# Patient Record
Sex: FEMALE | Race: WHITE | Employment: FULL TIME | URBAN - METROPOLITAN AREA
[De-identification: names, ages, dates, MRNs, and addresses within clinical notes are randomized per-mention and may not be internally consistent; named-entity substitution may affect disease eponyms.]

---

## 2023-04-14 ENCOUNTER — HOSPITAL ENCOUNTER (OUTPATIENT)
Dept: PREADMISSION TESTING | Age: 38
Discharge: HOME | End: 2023-04-14
Payer: SELF-PAY

## 2023-04-14 VITALS
HEIGHT: 68 IN | HEART RATE: 65 BPM | WEIGHT: 218.26 LBS | SYSTOLIC BLOOD PRESSURE: 117 MMHG | BODY MASS INDEX: 33.08 KG/M2 | DIASTOLIC BLOOD PRESSURE: 81 MMHG | TEMPERATURE: 97.5 F

## 2023-04-14 LAB
ANION GAP SERPL CALC-SCNC: 5 MMOL/L (ref 5–15)
BASOPHILS # BLD: 0 K/UL (ref 0–0.1)
BASOPHILS NFR BLD: 0 % (ref 0–1)
BUN SERPL-MCNC: 15 MG/DL (ref 6–20)
BUN/CREAT SERPL: 22 (ref 12–20)
CALCIUM SERPL-MCNC: 8.9 MG/DL (ref 8.5–10.1)
CHLORIDE SERPL-SCNC: 105 MMOL/L (ref 97–108)
CO2 SERPL-SCNC: 27 MMOL/L (ref 21–32)
CREAT SERPL-MCNC: 0.69 MG/DL (ref 0.55–1.02)
DIFFERENTIAL METHOD BLD: NORMAL
EOSINOPHIL # BLD: 0.1 K/UL (ref 0–0.4)
EOSINOPHIL NFR BLD: 1 % (ref 0–7)
ERYTHROCYTE [DISTWIDTH] IN BLOOD BY AUTOMATED COUNT: 13.3 % (ref 11.5–14.5)
GLUCOSE SERPL-MCNC: 100 MG/DL (ref 65–100)
HCT VFR BLD AUTO: 39.2 % (ref 35–47)
HGB BLD-MCNC: 12.8 G/DL (ref 11.5–16)
IMM GRANULOCYTES # BLD AUTO: 0 K/UL
IMM GRANULOCYTES NFR BLD AUTO: 0 %
LYMPHOCYTES # BLD: 2.6 K/UL (ref 0.8–3.5)
LYMPHOCYTES NFR BLD: 30 % (ref 12–49)
MCH RBC QN AUTO: 29.8 PG (ref 26–34)
MCHC RBC AUTO-ENTMCNC: 32.7 G/DL (ref 30–36.5)
MCV RBC AUTO: 91.2 FL (ref 80–99)
MONOCYTES # BLD: 0.6 K/UL (ref 0–1)
MONOCYTES NFR BLD: 7 % (ref 5–13)
NEUTS SEG # BLD: 5.4 K/UL (ref 1.8–8)
NEUTS SEG NFR BLD: 62 % (ref 32–75)
NRBC # BLD: 0 K/UL (ref 0–0.01)
NRBC BLD-RTO: 0 PER 100 WBC
PLATELET # BLD AUTO: 327 K/UL (ref 150–400)
PMV BLD AUTO: 10.4 FL (ref 8.9–12.9)
POTASSIUM SERPL-SCNC: 4.2 MMOL/L (ref 3.5–5.1)
RBC # BLD AUTO: 4.3 M/UL (ref 3.8–5.2)
RBC MORPH BLD: NORMAL
SODIUM SERPL-SCNC: 137 MMOL/L (ref 136–145)
WBC # BLD AUTO: 8.7 K/UL (ref 3.6–11)

## 2023-04-14 PROCEDURE — 36415 COLL VENOUS BLD VENIPUNCTURE: CPT

## 2023-04-14 PROCEDURE — 93005 ELECTROCARDIOGRAM TRACING: CPT

## 2023-04-14 PROCEDURE — 80048 BASIC METABOLIC PNL TOTAL CA: CPT

## 2023-04-14 PROCEDURE — 85025 COMPLETE CBC W/AUTO DIFF WBC: CPT

## 2023-04-14 RX ORDER — SERTRALINE HYDROCHLORIDE 100 MG/1
100 TABLET, FILM COATED ORAL
COMMUNITY

## 2023-04-14 RX ORDER — MELATONIN
1000 DAILY
COMMUNITY

## 2023-04-15 LAB
ATRIAL RATE: 68 BPM
CALCULATED P AXIS, ECG09: 22 DEGREES
CALCULATED R AXIS, ECG10: 14 DEGREES
CALCULATED T AXIS, ECG11: 18 DEGREES
DIAGNOSIS, 93000: NORMAL
P-R INTERVAL, ECG05: 178 MS
Q-T INTERVAL, ECG07: 428 MS
QRS DURATION, ECG06: 82 MS
QTC CALCULATION (BEZET), ECG08: 455 MS
VENTRICULAR RATE, ECG03: 68 BPM

## 2023-04-25 ENCOUNTER — ANESTHESIA EVENT (OUTPATIENT)
Dept: MEDSURG UNIT | Age: 38
End: 2023-04-25
Payer: SELF-PAY

## 2023-04-26 ENCOUNTER — ANESTHESIA (OUTPATIENT)
Dept: MEDSURG UNIT | Age: 38
End: 2023-04-26
Payer: SELF-PAY

## 2023-04-26 ENCOUNTER — HOSPITAL ENCOUNTER (OUTPATIENT)
Age: 38
Setting detail: OBSERVATION
Discharge: HOME | End: 2023-04-27
Attending: PLASTIC SURGERY | Admitting: PLASTIC SURGERY
Payer: SELF-PAY

## 2023-04-26 PROBLEM — Z98.890 S/P COSMETIC PLASTIC SURGERY: Status: ACTIVE | Noted: 2023-04-26

## 2023-04-26 LAB — HCG UR QL: NEGATIVE

## 2023-04-26 PROCEDURE — 74011000250 HC RX REV CODE- 250: Performed by: ANESTHESIOLOGY

## 2023-04-26 PROCEDURE — G0378 HOSPITAL OBSERVATION PER HR: HCPCS

## 2023-04-26 PROCEDURE — 77030012406 HC DRN WND PENRS BARD -A: Performed by: PLASTIC SURGERY

## 2023-04-26 PROCEDURE — 74011250636 HC RX REV CODE- 250/636: Performed by: ANESTHESIOLOGY

## 2023-04-26 PROCEDURE — 77030040206 HC TBNG LIPO SUC MDII -A: Performed by: PLASTIC SURGERY

## 2023-04-26 PROCEDURE — 76030000010 HC AMB SURG OR TIME 5 TO 5.5: Performed by: PLASTIC SURGERY

## 2023-04-26 PROCEDURE — 74011250636 HC RX REV CODE- 250/636: Performed by: PLASTIC SURGERY

## 2023-04-26 PROCEDURE — 74011250636 HC RX REV CODE- 250/636: Performed by: STUDENT IN AN ORGANIZED HEALTH CARE EDUCATION/TRAINING PROGRAM

## 2023-04-26 PROCEDURE — 77030040361 HC SLV COMPR DVT MDII -B: Performed by: PLASTIC SURGERY

## 2023-04-26 PROCEDURE — 77030008462 HC STPLR SKN PROX J&J -A: Performed by: PLASTIC SURGERY

## 2023-04-26 PROCEDURE — 77030008534 HC TBNG LIPOSUC BYRO -B: Performed by: PLASTIC SURGERY

## 2023-04-26 PROCEDURE — 74011250637 HC RX REV CODE- 250/637: Performed by: PLASTIC SURGERY

## 2023-04-26 PROCEDURE — 74011000250 HC RX REV CODE- 250: Performed by: PLASTIC SURGERY

## 2023-04-26 PROCEDURE — 2709999900 HC NON-CHARGEABLE SUPPLY: Performed by: PLASTIC SURGERY

## 2023-04-26 PROCEDURE — 77030002916 HC SUT ETHLN J&J -A: Performed by: PLASTIC SURGERY

## 2023-04-26 PROCEDURE — 76210000034 HC AMBSU PH I REC 0.5 TO 1 HR: Performed by: PLASTIC SURGERY

## 2023-04-26 PROCEDURE — 77030008684 HC TU ET CUF COVD -B: Performed by: ANESTHESIOLOGY

## 2023-04-26 PROCEDURE — 76060000070 HC AMB SURG ANES 5 TO 5.5 HR: Performed by: PLASTIC SURGERY

## 2023-04-26 PROCEDURE — 77030026438 HC STYL ET INTUB CARD -A: Performed by: ANESTHESIOLOGY

## 2023-04-26 PROCEDURE — 81025 URINE PREGNANCY TEST: CPT

## 2023-04-26 RX ORDER — HYDROMORPHONE HYDROCHLORIDE 2 MG/ML
INJECTION, SOLUTION INTRAMUSCULAR; INTRAVENOUS; SUBCUTANEOUS AS NEEDED
Status: DISCONTINUED | OUTPATIENT
Start: 2023-04-26 | End: 2023-04-26 | Stop reason: HOSPADM

## 2023-04-26 RX ORDER — MORPHINE SULFATE 2 MG/ML
2 INJECTION, SOLUTION INTRAMUSCULAR; INTRAVENOUS
Status: DISCONTINUED | OUTPATIENT
Start: 2023-04-26 | End: 2023-04-26 | Stop reason: HOSPADM

## 2023-04-26 RX ORDER — DEXMEDETOMIDINE HYDROCHLORIDE 100 UG/ML
INJECTION, SOLUTION INTRAVENOUS AS NEEDED
Status: DISCONTINUED | OUTPATIENT
Start: 2023-04-26 | End: 2023-04-26 | Stop reason: HOSPADM

## 2023-04-26 RX ORDER — PROCHLORPERAZINE EDISYLATE 5 MG/ML
10 INJECTION INTRAMUSCULAR; INTRAVENOUS
Status: DISCONTINUED | OUTPATIENT
Start: 2023-04-26 | End: 2023-04-27 | Stop reason: HOSPADM

## 2023-04-26 RX ORDER — SODIUM CHLORIDE, SODIUM LACTATE, POTASSIUM CHLORIDE, CALCIUM CHLORIDE 600; 310; 30; 20 MG/100ML; MG/100ML; MG/100ML; MG/100ML
INJECTION, SOLUTION INTRAVENOUS
Status: DISCONTINUED | OUTPATIENT
Start: 2023-04-26 | End: 2023-04-26 | Stop reason: HOSPADM

## 2023-04-26 RX ORDER — FENTANYL CITRATE 50 UG/ML
25 INJECTION, SOLUTION INTRAMUSCULAR; INTRAVENOUS
Status: DISCONTINUED | OUTPATIENT
Start: 2023-04-26 | End: 2023-04-26 | Stop reason: HOSPADM

## 2023-04-26 RX ORDER — ROPIVACAINE HYDROCHLORIDE 5 MG/ML
30 INJECTION, SOLUTION EPIDURAL; INFILTRATION; PERINEURAL AS NEEDED
Status: DISCONTINUED | OUTPATIENT
Start: 2023-04-26 | End: 2023-04-26 | Stop reason: HOSPADM

## 2023-04-26 RX ORDER — ONDANSETRON 4 MG/1
8 TABLET, ORALLY DISINTEGRATING ORAL
Status: DISCONTINUED | OUTPATIENT
Start: 2023-04-26 | End: 2023-04-27 | Stop reason: HOSPADM

## 2023-04-26 RX ORDER — SODIUM CHLORIDE, SODIUM LACTATE, POTASSIUM CHLORIDE, CALCIUM CHLORIDE 600; 310; 30; 20 MG/100ML; MG/100ML; MG/100ML; MG/100ML
125 INJECTION, SOLUTION INTRAVENOUS CONTINUOUS
Status: DISCONTINUED | OUTPATIENT
Start: 2023-04-26 | End: 2023-04-26 | Stop reason: HOSPADM

## 2023-04-26 RX ORDER — MIDAZOLAM HYDROCHLORIDE 1 MG/ML
1 INJECTION, SOLUTION INTRAMUSCULAR; INTRAVENOUS
Status: DISCONTINUED | OUTPATIENT
Start: 2023-04-26 | End: 2023-04-26 | Stop reason: HOSPADM

## 2023-04-26 RX ORDER — NEOSTIGMINE METHYLSULFATE 1 MG/ML
INJECTION, SOLUTION INTRAVENOUS AS NEEDED
Status: DISCONTINUED | OUTPATIENT
Start: 2023-04-26 | End: 2023-04-26 | Stop reason: HOSPADM

## 2023-04-26 RX ORDER — KETAMINE HCL IN 0.9 % NACL 50 MG/5 ML
SYRINGE (ML) INTRAVENOUS AS NEEDED
Status: DISCONTINUED | OUTPATIENT
Start: 2023-04-26 | End: 2023-04-26 | Stop reason: HOSPADM

## 2023-04-26 RX ORDER — DIAZEPAM 2 MG/1
5 TABLET ORAL
Status: DISCONTINUED | OUTPATIENT
Start: 2023-04-26 | End: 2023-04-27 | Stop reason: HOSPADM

## 2023-04-26 RX ORDER — SODIUM CHLORIDE 0.9 % (FLUSH) 0.9 %
5-40 SYRINGE (ML) INJECTION AS NEEDED
Status: DISCONTINUED | OUTPATIENT
Start: 2023-04-26 | End: 2023-04-26 | Stop reason: HOSPADM

## 2023-04-26 RX ORDER — ROCURONIUM BROMIDE 10 MG/ML
INJECTION, SOLUTION INTRAVENOUS AS NEEDED
Status: DISCONTINUED | OUTPATIENT
Start: 2023-04-26 | End: 2023-04-26 | Stop reason: HOSPADM

## 2023-04-26 RX ORDER — ONDANSETRON 2 MG/ML
INJECTION INTRAMUSCULAR; INTRAVENOUS AS NEEDED
Status: DISCONTINUED | OUTPATIENT
Start: 2023-04-26 | End: 2023-04-26 | Stop reason: HOSPADM

## 2023-04-26 RX ORDER — ONDANSETRON 2 MG/ML
4 INJECTION INTRAMUSCULAR; INTRAVENOUS AS NEEDED
Status: DISCONTINUED | OUTPATIENT
Start: 2023-04-26 | End: 2023-04-26 | Stop reason: HOSPADM

## 2023-04-26 RX ORDER — FENTANYL CITRATE 50 UG/ML
50 INJECTION, SOLUTION INTRAMUSCULAR; INTRAVENOUS AS NEEDED
Status: DISCONTINUED | OUTPATIENT
Start: 2023-04-26 | End: 2023-04-26 | Stop reason: HOSPADM

## 2023-04-26 RX ORDER — BACITRACIN ZINC 500 UNIT/G
5 OINTMENT (GRAM) TOPICAL 2 TIMES DAILY
Status: DISCONTINUED | OUTPATIENT
Start: 2023-04-26 | End: 2023-04-26

## 2023-04-26 RX ORDER — FENTANYL CITRATE 50 UG/ML
INJECTION, SOLUTION INTRAMUSCULAR; INTRAVENOUS AS NEEDED
Status: DISCONTINUED | OUTPATIENT
Start: 2023-04-26 | End: 2023-04-26 | Stop reason: HOSPADM

## 2023-04-26 RX ORDER — DEXTROSE, SODIUM CHLORIDE, SODIUM LACTATE, POTASSIUM CHLORIDE, AND CALCIUM CHLORIDE 5; .6; .31; .03; .02 G/100ML; G/100ML; G/100ML; G/100ML; G/100ML
125 INJECTION, SOLUTION INTRAVENOUS CONTINUOUS
Status: DISCONTINUED | OUTPATIENT
Start: 2023-04-26 | End: 2023-04-26 | Stop reason: HOSPADM

## 2023-04-26 RX ORDER — LIDOCAINE HYDROCHLORIDE 10 MG/ML
0.5 INJECTION, SOLUTION EPIDURAL; INFILTRATION; INTRACAUDAL; PERINEURAL AS NEEDED
Status: DISCONTINUED | OUTPATIENT
Start: 2023-04-26 | End: 2023-04-26 | Stop reason: HOSPADM

## 2023-04-26 RX ORDER — MIDAZOLAM HYDROCHLORIDE 1 MG/ML
1 INJECTION, SOLUTION INTRAMUSCULAR; INTRAVENOUS AS NEEDED
Status: DISCONTINUED | OUTPATIENT
Start: 2023-04-26 | End: 2023-04-26 | Stop reason: HOSPADM

## 2023-04-26 RX ORDER — PROPOFOL 10 MG/ML
INJECTION, EMULSION INTRAVENOUS AS NEEDED
Status: DISCONTINUED | OUTPATIENT
Start: 2023-04-26 | End: 2023-04-26 | Stop reason: HOSPADM

## 2023-04-26 RX ORDER — OXYCODONE HYDROCHLORIDE 5 MG/1
5 TABLET ORAL AS NEEDED
Status: DISCONTINUED | OUTPATIENT
Start: 2023-04-26 | End: 2023-04-26 | Stop reason: HOSPADM

## 2023-04-26 RX ORDER — HYDROMORPHONE HYDROCHLORIDE 2 MG/ML
2 INJECTION, SOLUTION INTRAMUSCULAR; INTRAVENOUS; SUBCUTANEOUS
Status: DISCONTINUED | OUTPATIENT
Start: 2023-04-26 | End: 2023-04-27 | Stop reason: HOSPADM

## 2023-04-26 RX ORDER — SCOLOPAMINE TRANSDERMAL SYSTEM 1 MG/1
1 PATCH, EXTENDED RELEASE TRANSDERMAL
Status: DISCONTINUED | OUTPATIENT
Start: 2023-04-26 | End: 2023-04-26 | Stop reason: HOSPADM

## 2023-04-26 RX ORDER — SODIUM CHLORIDE 0.9 % (FLUSH) 0.9 %
5-40 SYRINGE (ML) INJECTION EVERY 8 HOURS
Status: DISCONTINUED | OUTPATIENT
Start: 2023-04-26 | End: 2023-04-26 | Stop reason: HOSPADM

## 2023-04-26 RX ORDER — LIDOCAINE HYDROCHLORIDE 20 MG/ML
INJECTION, SOLUTION EPIDURAL; INFILTRATION; INTRACAUDAL; PERINEURAL AS NEEDED
Status: DISCONTINUED | OUTPATIENT
Start: 2023-04-26 | End: 2023-04-26 | Stop reason: HOSPADM

## 2023-04-26 RX ORDER — EPHEDRINE SULFATE/0.9% NACL/PF 50 MG/5 ML
SYRINGE (ML) INTRAVENOUS AS NEEDED
Status: DISCONTINUED | OUTPATIENT
Start: 2023-04-26 | End: 2023-04-26 | Stop reason: HOSPADM

## 2023-04-26 RX ORDER — MIDAZOLAM HYDROCHLORIDE 1 MG/ML
INJECTION, SOLUTION INTRAMUSCULAR; INTRAVENOUS AS NEEDED
Status: DISCONTINUED | OUTPATIENT
Start: 2023-04-26 | End: 2023-04-26 | Stop reason: HOSPADM

## 2023-04-26 RX ORDER — ACETAMINOPHEN 325 MG/1
650 TABLET ORAL ONCE
Status: DISCONTINUED | OUTPATIENT
Start: 2023-04-26 | End: 2023-04-26 | Stop reason: HOSPADM

## 2023-04-26 RX ORDER — DIPHENHYDRAMINE HCL 25 MG
50 CAPSULE ORAL
Status: DISCONTINUED | OUTPATIENT
Start: 2023-04-26 | End: 2023-04-27 | Stop reason: HOSPADM

## 2023-04-26 RX ORDER — SODIUM CHLORIDE, SODIUM LACTATE, POTASSIUM CHLORIDE, CALCIUM CHLORIDE 600; 310; 30; 20 MG/100ML; MG/100ML; MG/100ML; MG/100ML
150 INJECTION, SOLUTION INTRAVENOUS CONTINUOUS
Status: DISCONTINUED | OUTPATIENT
Start: 2023-04-26 | End: 2023-04-27 | Stop reason: HOSPADM

## 2023-04-26 RX ORDER — SUCCINYLCHOLINE CHLORIDE 20 MG/ML
INJECTION INTRAMUSCULAR; INTRAVENOUS AS NEEDED
Status: DISCONTINUED | OUTPATIENT
Start: 2023-04-26 | End: 2023-04-26 | Stop reason: HOSPADM

## 2023-04-26 RX ORDER — DEXAMETHASONE SODIUM PHOSPHATE 4 MG/ML
INJECTION, SOLUTION INTRA-ARTICULAR; INTRALESIONAL; INTRAMUSCULAR; INTRAVENOUS; SOFT TISSUE AS NEEDED
Status: DISCONTINUED | OUTPATIENT
Start: 2023-04-26 | End: 2023-04-26 | Stop reason: HOSPADM

## 2023-04-26 RX ORDER — HYDROMORPHONE HYDROCHLORIDE 1 MG/ML
0.2 INJECTION, SOLUTION INTRAMUSCULAR; INTRAVENOUS; SUBCUTANEOUS ONCE
Status: COMPLETED | OUTPATIENT
Start: 2023-04-26 | End: 2023-04-26

## 2023-04-26 RX ORDER — HYDROMORPHONE HYDROCHLORIDE 4 MG/1
4 TABLET ORAL
Status: DISCONTINUED | OUTPATIENT
Start: 2023-04-26 | End: 2023-04-27 | Stop reason: HOSPADM

## 2023-04-26 RX ORDER — GLYCOPYRROLATE 0.2 MG/ML
INJECTION INTRAMUSCULAR; INTRAVENOUS AS NEEDED
Status: DISCONTINUED | OUTPATIENT
Start: 2023-04-26 | End: 2023-04-26 | Stop reason: HOSPADM

## 2023-04-26 RX ORDER — DIPHENHYDRAMINE HYDROCHLORIDE 50 MG/ML
12.5 INJECTION, SOLUTION INTRAMUSCULAR; INTRAVENOUS AS NEEDED
Status: DISCONTINUED | OUTPATIENT
Start: 2023-04-26 | End: 2023-04-26 | Stop reason: HOSPADM

## 2023-04-26 RX ORDER — AMOXICILLIN 250 MG
1 CAPSULE ORAL 2 TIMES DAILY
Status: DISCONTINUED | OUTPATIENT
Start: 2023-04-26 | End: 2023-04-27 | Stop reason: HOSPADM

## 2023-04-26 RX ORDER — BACITRACIN 500 [USP'U]/G
5 OINTMENT TOPICAL 2 TIMES DAILY
Status: DISCONTINUED | OUTPATIENT
Start: 2023-04-26 | End: 2023-04-27 | Stop reason: HOSPADM

## 2023-04-26 RX ADMIN — Medication 25 MG: at 13:35

## 2023-04-26 RX ADMIN — HYDROMORPHONE HYDROCHLORIDE 2 MG: 2 INJECTION, SOLUTION INTRAMUSCULAR; INTRAVENOUS; SUBCUTANEOUS at 20:19

## 2023-04-26 RX ADMIN — ROCURONIUM BROMIDE 20 MG: 10 SOLUTION INTRAVENOUS at 15:40

## 2023-04-26 RX ADMIN — DEXAMETHASONE SODIUM PHOSPHATE 8 MG: 4 INJECTION, SOLUTION INTRAMUSCULAR; INTRAVENOUS at 13:52

## 2023-04-26 RX ADMIN — HYDROMORPHONE HYDROCHLORIDE 0.2 MG: 1 INJECTION, SOLUTION INTRAMUSCULAR; INTRAVENOUS; SUBCUTANEOUS at 19:18

## 2023-04-26 RX ADMIN — ONDANSETRON HYDROCHLORIDE 4 MG: 2 INJECTION, SOLUTION INTRAMUSCULAR; INTRAVENOUS at 17:22

## 2023-04-26 RX ADMIN — Medication 1.5 MG: at 18:24

## 2023-04-26 RX ADMIN — ROCURONIUM BROMIDE 20 MG: 10 SOLUTION INTRAVENOUS at 14:30

## 2023-04-26 RX ADMIN — DEXMEDETOMIDINE HYDROCHLORIDE 10 MCG: 100 INJECTION, SOLUTION, CONCENTRATE INTRAVENOUS at 14:14

## 2023-04-26 RX ADMIN — MIDAZOLAM 2 MG: 1 INJECTION INTRAMUSCULAR; INTRAVENOUS at 13:18

## 2023-04-26 RX ADMIN — HYDROMORPHONE HYDROCHLORIDE 0.5 MG: 2 INJECTION, SOLUTION INTRAMUSCULAR; INTRAVENOUS; SUBCUTANEOUS at 18:22

## 2023-04-26 RX ADMIN — ROCURONIUM BROMIDE 20 MG: 10 SOLUTION INTRAVENOUS at 15:10

## 2023-04-26 RX ADMIN — HYDROMORPHONE HYDROCHLORIDE 2 MG: 2 INJECTION, SOLUTION INTRAMUSCULAR; INTRAVENOUS; SUBCUTANEOUS at 23:05

## 2023-04-26 RX ADMIN — GLYCOPYRROLATE 0.2 MG: 0.2 INJECTION INTRAMUSCULAR; INTRAVENOUS at 18:24

## 2023-04-26 RX ADMIN — HYDROMORPHONE HYDROCHLORIDE 1 MG: 2 INJECTION, SOLUTION INTRAMUSCULAR; INTRAVENOUS; SUBCUTANEOUS at 14:42

## 2023-04-26 RX ADMIN — SODIUM CHLORIDE, POTASSIUM CHLORIDE, SODIUM LACTATE AND CALCIUM CHLORIDE: 600; 310; 30; 20 INJECTION, SOLUTION INTRAVENOUS at 13:18

## 2023-04-26 RX ADMIN — ROCURONIUM BROMIDE 40 MG: 10 SOLUTION INTRAVENOUS at 13:32

## 2023-04-26 RX ADMIN — SUCCINYLCHOLINE CHLORIDE 140 MG: 20 INJECTION, SOLUTION INTRAMUSCULAR; INTRAVENOUS at 13:27

## 2023-04-26 RX ADMIN — DEXMEDETOMIDINE HYDROCHLORIDE 10 MCG: 100 INJECTION, SOLUTION, CONCENTRATE INTRAVENOUS at 13:53

## 2023-04-26 RX ADMIN — LIDOCAINE HYDROCHLORIDE 80 MG: 20 INJECTION, SOLUTION EPIDURAL; INFILTRATION; INTRACAUDAL; PERINEURAL at 13:24

## 2023-04-26 RX ADMIN — WATER 2 G: 1 INJECTION INTRAMUSCULAR; INTRAVENOUS; SUBCUTANEOUS at 17:48

## 2023-04-26 RX ADMIN — Medication 10 MG: at 16:20

## 2023-04-26 RX ADMIN — Medication 25 MG: at 14:14

## 2023-04-26 RX ADMIN — ROCURONIUM BROMIDE 10 MG: 10 SOLUTION INTRAVENOUS at 13:24

## 2023-04-26 RX ADMIN — SODIUM CHLORIDE, POTASSIUM CHLORIDE, SODIUM LACTATE AND CALCIUM CHLORIDE: 600; 310; 30; 20 INJECTION, SOLUTION INTRAVENOUS at 17:00

## 2023-04-26 RX ADMIN — SODIUM CHLORIDE, POTASSIUM CHLORIDE, SODIUM LACTATE AND CALCIUM CHLORIDE: 600; 310; 30; 20 INJECTION, SOLUTION INTRAVENOUS at 14:30

## 2023-04-26 RX ADMIN — DIAZEPAM 5 MG: 2 TABLET ORAL at 23:05

## 2023-04-26 RX ADMIN — SODIUM CHLORIDE, POTASSIUM CHLORIDE, SODIUM LACTATE AND CALCIUM CHLORIDE 150 ML/HR: 600; 310; 30; 20 INJECTION, SOLUTION INTRAVENOUS at 20:20

## 2023-04-26 RX ADMIN — WATER 2 G: 1 INJECTION INTRAMUSCULAR; INTRAVENOUS; SUBCUTANEOUS at 13:48

## 2023-04-26 RX ADMIN — FENTANYL CITRATE 100 MCG: 50 INJECTION, SOLUTION INTRAMUSCULAR; INTRAVENOUS at 13:24

## 2023-04-26 RX ADMIN — HYDROMORPHONE HYDROCHLORIDE 0.5 MG: 2 INJECTION, SOLUTION INTRAMUSCULAR; INTRAVENOUS; SUBCUTANEOUS at 18:00

## 2023-04-26 RX ADMIN — PROPOFOL 100 MG: 10 INJECTION, EMULSION INTRAVENOUS at 14:15

## 2023-04-26 RX ADMIN — PROPOFOL 200 MG: 10 INJECTION, EMULSION INTRAVENOUS at 13:26

## 2023-04-26 RX ADMIN — ONDANSETRON 8 MG: 4 TABLET, ORALLY DISINTEGRATING ORAL at 23:20

## 2023-04-26 NOTE — H&P
Pre-op History and Physical    CC: Encounter for cosmetic surgery [Z41.1]   HPI: 44y.o. year old female with Encounter for cosmetic surgery [Z41.1] sim presents  for Procedure(s) with comments:  LIPOSUCTION WITH J-PLASMA OF ABDOMEN, BACK, AND AXILLA - axilla. Please see clinic HP for full details. 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 Lalita Encarnacion OTHER SURGICAL  2-9-07    resection of the left trapezius muscle/ dr. Jn Gómez history:   Family History   Problem Relation Age of Onset    Anesth Problems Mother         VOMITING HEADACHES    Kidney cancer Mother     SKIN CANCER Father     COPD Father       Social history:   Social History     Socioeconomic History    Marital status: SINGLE     Spouse name: Not on file    Number of children: Not on file    Years of education: Not on file    Highest education level: Not on file   Occupational History    Not on file   Tobacco Use    Smoking status: Never    Smokeless tobacco: Never   Substance and Sexual Activity    Alcohol use: Yes     Comment: 4-5 DRINKS WEEKLY    Drug use: Never    Sexual activity: Not on file   Other Topics Concern    Not on file   Social History Narrative    Not on file     Social Determinants of Health     Financial Resource Strain: Not on file   Food Insecurity: Not on file   Transportation Needs: Not on file   Physical Activity: Not on file   Stress: Not on file   Social Connections: Not on file   Intimate Partner Violence: Not on file   Housing Stability: Not on file      Home Medications:   Prior to Admission medications    Medication Sig Start Date End Date Taking? Authorizing Provider   sertraline (ZOLOFT) 100 mg tablet Take 1 Tablet by mouth nightly.     Provider, Historical   cholecalciferol (VITAMIN D3) (1000 Units /25 mcg) tablet Take 1 Tablet by mouth daily. Provider, Historical   norethindrone-ethinyl estradiol (JUNEL FE 1/20) 1 mg-20 mcg (21)/75 mg (7) tab Take 1 Tablet by mouth every evening. Provider, Historical      Allergies: Allergies   Allergen Reactions    Codeine Nausea and Vomiting     halluncinations      Review of systems:  Denies headache, fever, chills, weight change, congestion, sore throat, chest pain, shortness of breath, nausea, vomiting, diarrhea, constipation, abdominal pain, generalized weakness, muscle or joint pain, and rash. Physical Exam:  Vitals: Blood pressure 122/85, pulse 81, temperature 97.9 °F (36.6 °C), resp. rate 18, height 5' 8\" (1.727 m), weight 98.9 kg (218 lb), last menstrual period 04/13/2023, SpO2 99 %. General: awake and alert, NAD  Neck: supple  Cor: RRR  Lungs: clear  Abdomen: soft, non-tender, non-distended  Extremities: no edema  Breast:  Skin: lipocutateous dystrophy  HEENT:      Impression: Encounter for cosmetic surgery [Z41.1]    Plan:  Procedure(s) with comments:  LIPOSUCTION WITH J-PLASMA OF ABDOMEN, BACK, AND AXILLA - axilla . The patient was counseled at length about the risks of sherry Covid-19 during their perioperative period and any recovery window from their procedure. The patient was made aware that sherry Covid-19  may worsen their prognosis for recovering from their procedure and lend to a higher morbidity and/or mortality risk. All material risks, benefits, and reasonable alternatives including postponing the procedure were discussed. The patient does  wish to proceed with the procedure at this time.    `

## 2023-04-26 NOTE — PROGRESS NOTES
Bedside and Verbal shift change report given to Lis Zavala  (oncoming nurse) by Brooke Boyer RN and Shruti Palma RN  (offgoing nurse). Report included the following information SBAR, Kardex, Procedure Summary, Intake/Output, MAR, and Recent Results.

## 2023-04-26 NOTE — PROGRESS NOTES
TRANSFER - IN REPORT:    Verbal report received from [facility] RN(name) on [facility] Care  being received from ASU(unit) for routine post - op      Report consisted of patients Situation, Background, Assessment and   Recommendations(SBAR). Information from the following report(s) SBAR, Kardex, Intake/Output, MAR, and Recent Results was reviewed with the receiving nurse. Opportunity for questions and clarification was provided. Assessment completed upon patients arrival to unit and care assumed.

## 2023-04-26 NOTE — BRIEF OP NOTE
Brief Postoperative Note    Patient: Tatiana Horne  YOB: 1985  MRN: [id number]    Date of Procedure: 4/26/2023     Pre-Op Diagnosis: Encounter for cosmetic surgery [Z41.1]    Post-Op Diagnosis: Same as preoperative diagnosis.       Procedure(s):  LIPOSUCTION WITH J-PLASMA OF ABDOMEN, BACK, AND AXILLA    Surgeon(s):  Swetha Rocha MD    Surgical Assistant: None    Anesthesia: General     Estimated Blood Loss (mL): 433     Complications: None    Specimens: * No specimens in log *     Implants: * No implants in log *    Drains: * No LDAs found *    Findings: none    Tumescent: 8000ml   Lipoaspirate:  10555 ml (back 5000ml, abdomen 5250ml)  Renuvion 80%/3L 25 kw (12 kw back, 13 kw front)    Electronically Signed by Georgia Castaneda MD on 4/26/2023 at 6:24 PM

## 2023-04-26 NOTE — PERIOP NOTES
TRANSFER - OUT REPORT:    Verbal report given to VALERIO Mayers and Vicki Lin RN on Yanelis Block  being transferred to [facility] for routine post - op       Report consisted of patients Situation, Background, Assessment and   Recommendations(SBAR). Information from the following report(s) SBAR, Kardex, OR Summary, Procedure Summary, Intake/Output, MAR, and Cardiac Rhythm    was reviewed with the receiving nurse. Lines:   Peripheral IV 04/26/23 Left Hand (Active)   Site Assessment Clean, dry, & intact 04/26/23 1907   Phlebitis Assessment 0 04/26/23 1907   Infiltration Assessment 0 04/26/23 1853   Dressing Status Clean, dry, & intact 04/26/23 1907   Dressing Type Transparent;Tape 04/26/23 1907   Hub Color/Line Status Blue; Infusing 04/26/23 1907        Opportunity for questions and clarification was provided. Patient transported with:   Registered Nurse    7581: Updated patient's significant other Terry Persaud of recovery and transfer to [address] [place] [address]: Receiving RN at bedside and assumed care of patient.

## 2023-04-27 VITALS
RESPIRATION RATE: 15 BRPM | HEIGHT: 68 IN | BODY MASS INDEX: 33.04 KG/M2 | HEART RATE: 82 BPM | TEMPERATURE: 97.8 F | DIASTOLIC BLOOD PRESSURE: 78 MMHG | SYSTOLIC BLOOD PRESSURE: 127 MMHG | WEIGHT: 218 LBS | OXYGEN SATURATION: 95 %

## 2023-04-27 LAB
ANION GAP SERPL CALC-SCNC: 8 MMOL/L (ref 5–15)
BUN SERPL-MCNC: 9 MG/DL (ref 6–20)
BUN/CREAT SERPL: 12 (ref 12–20)
CALCIUM SERPL-MCNC: 8.2 MG/DL (ref 8.5–10.1)
CHLORIDE SERPL-SCNC: 104 MMOL/L (ref 97–108)
CO2 SERPL-SCNC: 22 MMOL/L (ref 21–32)
CREAT SERPL-MCNC: 0.77 MG/DL (ref 0.55–1.02)
GLUCOSE SERPL-MCNC: 150 MG/DL (ref 65–100)
HCT VFR BLD AUTO: 34.4 % (ref 35–47)
HGB BLD-MCNC: 11.3 G/DL (ref 11.5–16)
POTASSIUM SERPL-SCNC: 3.9 MMOL/L (ref 3.5–5.1)
SODIUM SERPL-SCNC: 134 MMOL/L (ref 136–145)

## 2023-04-27 PROCEDURE — G0378 HOSPITAL OBSERVATION PER HR: HCPCS

## 2023-04-27 PROCEDURE — 96372 THER/PROPH/DIAG INJ SC/IM: CPT

## 2023-04-27 PROCEDURE — 80048 BASIC METABOLIC PNL TOTAL CA: CPT

## 2023-04-27 PROCEDURE — 74011250636 HC RX REV CODE- 250/636: Performed by: PLASTIC SURGERY

## 2023-04-27 PROCEDURE — 74011250637 HC RX REV CODE- 250/637: Performed by: PLASTIC SURGERY

## 2023-04-27 PROCEDURE — 74011000250 HC RX REV CODE- 250: Performed by: PLASTIC SURGERY

## 2023-04-27 PROCEDURE — 36415 COLL VENOUS BLD VENIPUNCTURE: CPT

## 2023-04-27 PROCEDURE — 85018 HEMOGLOBIN: CPT

## 2023-04-27 RX ORDER — ENOXAPARIN SODIUM 100 MG/ML
40 INJECTION SUBCUTANEOUS EVERY 24 HOURS
Status: DISCONTINUED | OUTPATIENT
Start: 2023-04-27 | End: 2023-04-27 | Stop reason: HOSPADM

## 2023-04-27 RX ADMIN — SODIUM CHLORIDE, POTASSIUM CHLORIDE, SODIUM LACTATE AND CALCIUM CHLORIDE 150 ML/HR: 600; 310; 30; 20 INJECTION, SOLUTION INTRAVENOUS at 04:19

## 2023-04-27 RX ADMIN — HYDROMORPHONE HYDROCHLORIDE 2 MG: 2 INJECTION, SOLUTION INTRAMUSCULAR; INTRAVENOUS; SUBCUTANEOUS at 04:19

## 2023-04-27 RX ADMIN — HYDROMORPHONE HYDROCHLORIDE 4 MG: 4 TABLET ORAL at 12:15

## 2023-04-27 RX ADMIN — ENOXAPARIN SODIUM 40 MG: 100 INJECTION SUBCUTANEOUS at 12:15

## 2023-04-27 RX ADMIN — CEFAZOLIN 1 G: 1 INJECTION, POWDER, FOR SOLUTION INTRAMUSCULAR; INTRAVENOUS at 10:55

## 2023-04-27 RX ADMIN — SENNOSIDES AND DOCUSATE SODIUM 1 TABLET: 50; 8.6 TABLET ORAL at 08:31

## 2023-04-27 RX ADMIN — HYDROMORPHONE HYDROCHLORIDE 4 MG: 4 TABLET ORAL at 08:31

## 2023-04-27 RX ADMIN — SODIUM CHLORIDE, POTASSIUM CHLORIDE, SODIUM LACTATE AND CALCIUM CHLORIDE 150 ML/HR: 600; 310; 30; 20 INJECTION, SOLUTION INTRAVENOUS at 08:31

## 2023-04-27 RX ADMIN — CEFAZOLIN 1 G: 1 INJECTION, POWDER, FOR SOLUTION INTRAMUSCULAR; INTRAVENOUS at 01:58

## 2023-04-27 NOTE — PROGRESS NOTES
Spiritual Care Assessment/Progress Note  [facility]      NAME: Yanelis Block      MRN: [id number]  AGE: 44 y.o.  SEX: female  Judaism Affiliation: Non Jew   Language: English     4/27/2023     Total Time (in minutes): 15     Spiritual Assessment begun in [address] through conversation with:         [x]Patient        [] Family    [] Friend(s)        Reason for Consult: Initial/Spiritual assessment, patient floor     Spiritual beliefs: (Please include comment if needed)     [] Identifies with a melissa tradition:         [] Supported by a melissa community:            [] Claims no spiritual orientation:           [] Seeking spiritual identity:                [] Adheres to an individual form of spirituality:           [] Not able to assess:                           Identified resources for coping:      [] Prayer                               [] Music                  [] Guided Imagery     [] Family/friends                 [] Pet visits     [] Devotional reading                         [] Unknown     [] Other:                                               Interventions offered during this visit: (See comments for more details)    Patient Interventions: Initial visit           Plan of Care:     [x] Support spiritual and/or cultural needs    [] Support AMD and/or advance care planning process      [] Support grieving process   [] Coordinate Rites and/or Rituals    [] Coordination with community clergy   [] No spiritual needs identified at this time   [] Detailed Plan of Care below (See Comments)  [] Make referral to Music Therapy  [] Make referral to Pet Therapy     [] Make referral to Addiction services  [] Make referral to [facility]  [] Make referral to Spiritual Care Partner  [] No future visits requested        [x] Contact Spiritual Care for further referrals     Comments:    Referral source:    was rounding on [address] at [facility] in [facility]. I reviewed the medical record prior to this encounter. Spiritual Assessment: At this time Rulon Postin was rest.  did not wake patient and respected the need for rest and healing. Outcome: Taitana Horne chart was reviewed. Plan of Care: Advised nurse to contact [facility] for any further referrals. The  on-call can be reached at [phone]). Rev.  Padmini Emmanuel MDiv, MAPT  Staff

## 2023-04-27 NOTE — DISCHARGE INSTRUCTIONS
DISCHARGE INSTRUCTIONS     Follow-up with Dr. Angel Sit in 1 week. Call  [phone]    Remove dressing(s) in 1 days . May Shower (Do not take Horse[place] Roxo)  In  1 days     Activity:     No strenous activity, No lifting greater then 10 lbs    Call for: fever, chills, vomiting, foul smelling drainage, bleeding, difficulty breathing, leg cramps  [phone]       Please call [facility] [phone]  to make arrangements from PACU. Quick Reference Instructions for After Liposuction     What to Expect:  Drainage: Its not uncommon to have a significant amount of light red or yellow color drainage the night following surgery and into the next day. I recommend sleeping on a towel or ijeoma protectors so as protect your bedding. Change gauze as needed, may also use maxi pads   Pain:    The local anesthetic medicine will most likely wear off about 1-2 hrs after surgery. You should take some oral pain medication (dilaudid, Percocet, hydromorphine, oxycondeine) when you get home and before going to bed. Follow the instructions on the bottle. Swelling:  Expect to feel swollen, distended and bruised following surgery. You will see a change in the area immediately after surgery and the next day following removal of your garment to shower. However, dont be alarmed if the area swells further the following day and even may be assymetric. This is all part of the normal healing process. Most irregularities and swelling will resolve over the following 1-3 months. ICE/HEAT:  I do not recommend any ice therapy initially post surgery. You should also AVOID  heating pads as the skin is somewhat numb and can be burned. Bruising: Its not uncommon to have bruising even extending into the genital region. There should not be any very large tense bruises isolated to one area. If you are concerned called Dr. Dat Okeefe:  There are No food restrictions after anesthesia. However, I would recommend eating small bland meals at first and drinking plenty of fluids. Avoid alcohol for the first 3 days post surgery and while you are taking the pain medications or valium    No Smoking or Nicotine products for 2 weeks post liposuction    Dressing Changes/Wound Care:  Remove binder and dressings the morning after surgery  You may use the adhesive lotion remover to remove the surgical prep  You may gently shower  Apply Antibiotic ointment to the incisions twice daily  Cover with 4x4 gauze & paper tape  or Bandaids    Garment/Compression:  You should keep the garment on the first night of surgery and wear the garment around 22 hours a day. It should be snug and comforting, not too tight, not too loose  You may wear a clean cotton t-shirt under the garment, this helps prevent irritation  If you notice the garment causing particular creases or pressure on one area of the skin, try to readjust the garment to allow for an even distribution of pressure    Showering: You may shower 24 hrs after the surgery  Do not submerge in water. Do not take a bath or swim until instructed by Dr. Dimitry Newton. Typically once sutures are removed. Activity  Do not perform any strenuous activity or heavy lifting (greater than 10 lbs) for 2 weeks post surgery. Anything that raises your blood pressure can increase the risk for bleeding. Exercise:  you may begin walking anytime after your surgery, light jogging the following week. But you should refrain from serious exercise until cleared by Dr. Dimitry Newton (typically 2-3 weeks)    Driving: You may not drive while on pain medication or valium. You should feel good enough that you are confident you can control the vehicle and slam on the brakes if needed. Medication  Antibiotics:  Oral Antibiotics: typically Keflex/ciprofloxin/cephalexin is prescribed.   Take these the night of surgery and continue as instructed on the bottle until the prescription is completed. Topical Antibiotic:  Either a prescription for bactroban/mupirocin will be written or over the counter Bacitracinshould be applied twice daily to the incisions. Dilaudid/Hydromorphine/oxycodone/Percocet:   One of these will be prescribe as your pain medication. Typically you can take 1-2 pills every 4-6hrs as needed. See instructions on bottle   Valium/Diazepam:  may also be prescribed, this medication helps with muscle pain, anxiety and sleep. Some patients find it helps a lot with pain as well. See instructions on the bottle, but usually you may take this every 6 hrs as need. Be careful taking this simultaneously with the pain medication. Initially space out taking the pain medication and valium by about one hour. Tylenol:  you may take Tylenol if you are prescribed dilaudid/hydromorphine. Do not exceed 3250 mg of Tylenol in a day and do not take it if prescribed Percocet/oxycodone/hydrocodone   Ibuprofen/aleve/naproxen/aspirin/advil:  Donot take, until 2 weeks after surgery  Nausea/Vomiting: This is not typical after awake surgery, but is common after general anesthesia. If you experience this Zofran or Phenergan can be prescribed  Stool Softener: It is recommended you take a stool softener as the pain medication can cause constipation. I recommend either Senna-S or Colace, two tablets twice daily     Follow Up:  Typically follow up is around 7-10 days post liposuction  You can call the office to arrange an appointment    Urgent Issues of Concern:After Hours Paging  [phone]    For Urgent issues contact Dr. Anum Carmona and 911 if you believe its a true emergency. Call with any questions. During the day you can reach us at our office number or email during the day   Hi:         [phone]  [email or web address]  Roger Chaney:  [phone]  [email or web address]  Urgent Issues:    Fever above 101.5  Large amounts of bright red blood (more than one gauze pad)  Difficult Breathing, Chest Pain, Shortness of Breath  Increased Swelling in the Legs  Calf or Leg Pain   Redness or Pus at the Surgical Site

## 2023-04-27 NOTE — DISCHARGE SUMMARY
Physician Discharge Summary     Patient ID:  Pavithra Marrero  [id number]  27 y.o.  1985    Allergies: Codeine    Admit Date: 4/26/2023    Discharge Date: 4/27/2023    * Admission Diagnoses: Encounter for cosmetic surgery [Z41.1]  S/P cosmetic plastic surgery [Z98.890]    * Discharge Diagnoses:    Hospital Problems as of 4/27/2023 Date Reviewed: 7/22/2014            Codes Class Noted - Resolved POA    S/P cosmetic plastic surgery ICD-10-CM: Z98.890  ICD-9-CM: V45.89  4/26/2023 - Present Unknown            Admission Condition: Good    * Discharge Condition: good    * Procedures: Procedure(s) with comments:  LIPOSUCTION WITH J-PLASMA OF ABDOMEN, BACK, AND AXILLA - axilla    * Hospital Course:   Normal hospital course for this procedure. Consults: None    Significant Diagnostic Studies:     * Disposition: Home    Discharge Medications:   Current Discharge Medication List        CONTINUE these medications which have NOT CHANGED    Details   sertraline (ZOLOFT) 100 mg tablet Take 1 Tablet by mouth nightly. cholecalciferol (VITAMIN D3) (1000 Units /25 mcg) tablet Take 1 Tablet by mouth daily. norethindrone-ethinyl estradiol (JUNEL FE 1/20) 1 mg-20 mcg (21)/75 mg (7) tab Take 1 Tablet by mouth every evening. * Follow-up Care/Patient Instructions:   Activity: See surgical instructions  Diet: Regular Diet  Wound Care: As directed    Follow-up Information       Follow up With Specialties Details Why Contact Info    Mu Bourne NP Nurse Practitioner, Family Medicine    [address] [place] [address]  [phone]            Follow-up tests/labs     Signed:  Rina Celis MD  4/27/2023  11:58 AM

## 2023-04-27 NOTE — PROGRESS NOTES
Bedside and Verbal shift change report given to Keira Hodges. Abdoulaye RN (oncoming nurse) by Brooke Arguello RN (offgoing nurse). Report included the following information SBAR, Kardex, ED Summary, Intake/Output, MAR, and Recent Results. 1230: I have reviewed discharge instructions with the patient. The patient verbalized understanding. New dressings applied; abdominal binder re-secured. Allowed time for patient to verbalize questions/ concerns. All questions/ concerns answered.

## 2023-04-27 NOTE — OP NOTES
[address]   OPERATIVE REPORT    Name:  Layton Castro  MR#:  [id number]  :  1985  ACCOUNT #:  [de-identified]  DATE OF SERVICE:  2023      LOCATION:  [facility]. SERVICE:  Plastic Surgery. 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 PROCEDURE PERFORMED:  Liposuction of abdomen, back, and posterior flank, and liposuction of the axilla. SURGEON:  Swetha Sorensen MD.    ASSISTANT:  none. ANESTHESIA:  General.    COMPLICATIONS:  None immediate. SPECIMENS REMOVED:  Lipoaspirate, discarded. IMPLANTS:  None. ESTIMATED BLOOD LOSS:  300 mL. DRAINS:  One-quarter inch Penrose drain placed in the anterior abdomen. DISPOSITION:  Stable to PACU, extubated. INDICATIONS:  A 28-year-old female seeking cosmetic improvement on abdomen and back. Risks and benefits of liposuction were discussed thoroughly with the patient including but not limited to bleeding, infection, seroma, hematoma, PE, DVT, contour irregularity, contour deformity, death, loose skin, and damage to surrounding structures. She was marked in the upright position for areas of lipodystrophy of the lower back, axilla, and abdomen. I discussed with patient's daughter. Chester Rahman PROCEDURE:  The patient underwent general anesthesia and was placed prone on the OR table. Upper and lower back was prepped and draped in sterile surgical fashion. Through multiple incisions, two in the lower back  , two in the mid back, two in the midline for a total of 8 incisions were made with a 15-blade scalpel. Tumescent solution was infiltrated throughout the back, a total of 4 mL was infiltrated. This was followed by lipoaspiration with power-assisted liposuction device with a Vibrasat using 3 and 4 mm cannulas. Approximately 5000 mL was aspirated from her back.   It was followed by Gin J-plasma at setting of 80% power, 3 L flow, 5 passes subcutaneously of all areas were performed, approximately 12 kilowatts were used. Additional aspiration was performed followed by closure with 3-0 nylon sutures in simple fashion  . Incision was dressed. The patient was flipped into supine position. Her abdomen, back, and axilla were prepped and draped in sterile surgical fashion. Through four incisions in groin, one on the umbilicus, 4 in the subcostal region, and 2 in the axilla region; additional tumescent was infiltrated 4000 mL bringing the total tumescent volume to 8000 mL. Lipoaspiration was once again performed with a Vibrasat and additional 5250 mL was obtained in these areas. Jerrell Ogren was then performed at similar settings, additional 13 kilowatts   aspiration was performed. The incisions were closed with 4-0 nylon sutures except for the two more medial groin incisions which were widened  one-quarter inch Penrose drain was inserted and secured in place. The patient was placed in a sterile dressing, abdominal binder, postop bra and allowed to recover without incident. Total tumescent volume for the case 8000 mL, total lipoaspirate was 10,250 mL. She will be admitted overnight for hydration and observation.         MD ELLIOT [email or web address]  D:  04/26/2023 18:51  T:  04/27/2023 7:09  JOB #:  [id number]

## 2023-04-27 NOTE — PROGRESS NOTES
Progress Note    Patient: Pavithra Marrero MRN: [id number]  SSN: [id number]    YOB: 1985  Age: 44 y.o. Sex: female      Admit Date: 2023    1 Day Post-Op    Procedure:  Procedure(s):  LIPOSUCTION WITH J-PLASMA OF ABDOMEN, BACK, AND AXILLA    Subjective:     Patient has no new complaints. Objective:     Visit Vitals  /78 (BP 1 Location: Right upper arm, BP Patient Position: At rest)   Pulse 82   Temp 97.8 °F (36.6 °C)   Resp 15   Ht 5' 8\" (1.727 m)   Wt 98.9 kg (218 lb)   SpO2 95%   BMI 33.15 kg/m²       Temp (24hrs), Av.1 °F (36.7 °C), Min:97.1 °F (36.2 °C), Max:98.9 °F (37.2 °C)      Physical Exam:    NAD, AAOX3  DRESSING C/D/I  AXILLA SOFT    Data Review: images and reports reviewed    Lab Review: All lab results for the last 24 hours reviewed.     Assessment:     Hospital Problems  Date Reviewed: 2014            Codes Class Noted POA    S/P cosmetic plastic surgery ICD-10-CM: Z98.890  ICD-9-CM: V45.89  2023 Unknown           Plan/Recommendations/Medical Decision Making:     D/C HOME  BEGIN LOVENOX  F/U TUESDAY

## 2023-04-28 NOTE — ANESTHESIA POSTPROCEDURE EVALUATION
Post-Anesthesia Evaluation and Assessment    Patient: Tatiana Horne MRN: [id number]  SSN: [id number]    YOB: 1985  Age: 44 y.o. Sex: female      I have evaluated the patient and they are stable and ready for discharge from the PACU. Cardiovascular Function/Vital Signs  Visit Vitals  /78 (BP 1 Location: Right upper arm, BP Patient Position: At rest)   Pulse 82   Temp 36.6 °C (97.8 °F)   Resp 15   Ht 5' 8\" (1.727 m)   Wt 98.9 kg (218 lb)   SpO2 95%   BMI 33.15 kg/m²       Patient is status post General anesthesia for Procedure(s) with comments:  LIPOSUCTION WITH J-PLASMA OF ABDOMEN, BACK, AND AXILLA - axilla. Nausea/Vomiting: None    Postoperative hydration reviewed and adequate. Pain:  Pain Scale 1: Numeric (0 - 10) (04/27/23 0831)  Pain Intensity 1: 6 (04/27/23 0831)   Managed    Neurological Status:   Neuro (WDL): Within Defined Limits (04/26/23 1920)  Neuro  Neurologic State: Alert (04/27/23 0831)  LUE Motor Response: Purposeful (04/26/23 1907)  LLE Motor Response: Purposeful (04/26/23 1907)  RUE Motor Response: Purposeful (04/26/23 1907)  RLE Motor Response: Purposeful (04/26/23 1907)   At baseline    Mental Status, Level of Consciousness: Alert and  oriented to person, place, and time    Pulmonary Status:   O2 Device: None (Room air) (04/27/23 0005)   Adequate oxygenation and airway patent    Complications related to anesthesia: None    Post-anesthesia assessment completed.  No concerns    Signed By: Amrit Silveira MD     April 28, 2023

## (undated) DEVICE — STAPLER SKN FIX HD COUNT STRL -- SUB STAPLER35WA  6/CA $58.16

## (undated) DEVICE — UNDERPAD INCON STD 36X23IN -- CONVERT TO 139120

## (undated) DEVICE — PAD,ABDOMINAL,5"X9",ST,LF,25/BX: Brand: MEDLINE INDUSTRIES, INC.

## (undated) DEVICE — SYR 3ML LL TIP 1/10ML GRAD --

## (undated) DEVICE — TOWEL SURG W17XL27IN STD BLU COT NONFENESTRATED PREWASHED

## (undated) DEVICE — TUBE LIPOSUCTION3 8X5 8X10IN --

## (undated) DEVICE — DRAIN WND PENRS RADPQ 0.25X12 --

## (undated) DEVICE — REM POLYHESIVE ADULT PATIENT RETURN ELECTRODE: Brand: VALLEYLAB

## (undated) DEVICE — SOLUTION IRRIG 1000ML 0.9% SOD CHL USP POUR PLAS BTL

## (undated) DEVICE — SYR 10ML LUER LOK 1/5ML GRAD --

## (undated) DEVICE — HANDLE LT SNAP ON ULT DURABLE LENS FOR TRUMPF ALC DISPOSABLE

## (undated) DEVICE — GARMENT,MEDLINE,DVT,INT,CALF,MED, GEN2: Brand: MEDLINE

## (undated) DEVICE — MINOR BASIN -SMH: Brand: MEDLINE INDUSTRIES, INC.

## (undated) DEVICE — SPONGE GZ W4XL4IN COT 12 PLY TYP VII WVN C FLD DSGN STERILE

## (undated) DEVICE — HYPODERMIC SAFETY NEEDLE: Brand: MONOJECT

## (undated) DEVICE — SPONGE LAPAROTOMY W18XL18IN WHITE STRUNG RADIOPAQUE STERILE

## (undated) DEVICE — PACK,ORTOHMAX/CVMAX,UNIVERSAL,5/CS: Brand: MEDLINE

## (undated) DEVICE — BNDG SELF ADH 4INX5YD STRL LF -- COFLEX LF2

## (undated) DEVICE — 3M™ TEGADERM™ TRANSPARENT FILM DRESSING FRAME STYLE, 1626W, 4 IN X 4-3/4 IN (10 CM X 12 CM), 50/CT 4CT/CASE: Brand: 3M™ TEGADERM™

## (undated) DEVICE — SUTURE ETHLN SZ 3-0 L18IN NONABSORBABLE BLK PS-2 L19MM 3/8 1669H

## (undated) DEVICE — PACK,BASIC,SIRUS,V: Brand: MEDLINE

## (undated) DEVICE — SUT ETHLN 4-0 18IN PS2 BLK --

## (undated) DEVICE — PREMIUM WET SKIN PREP TRAY: Brand: MEDLINE INDUSTRIES, INC.

## (undated) DEVICE — TUBING SUCT L9FT FOR AUTOFUSE INFLTR SYS

## (undated) DEVICE — DRAPE,REIN 53X77,STERILE: Brand: MEDLINE

## (undated) DEVICE — RINGERFTS IV SOL